# Patient Record
Sex: MALE | URBAN - METROPOLITAN AREA
[De-identification: names, ages, dates, MRNs, and addresses within clinical notes are randomized per-mention and may not be internally consistent; named-entity substitution may affect disease eponyms.]

---

## 2021-06-03 ENCOUNTER — APPOINTMENT (RX ONLY)
Dept: URBAN - METROPOLITAN AREA CLINIC 83 | Facility: CLINIC | Age: 66
Setting detail: DERMATOLOGY
End: 2021-06-03

## 2021-06-03 DIAGNOSIS — L29.89 OTHER PRURITUS: ICD-10-CM

## 2021-06-03 DIAGNOSIS — L29.8 OTHER PRURITUS: ICD-10-CM

## 2021-06-03 DIAGNOSIS — L85.3 XEROSIS CUTIS: ICD-10-CM

## 2021-06-03 DIAGNOSIS — L20.89 OTHER ATOPIC DERMATITIS: ICD-10-CM

## 2021-06-03 PROCEDURE — ? PRESCRIPTION

## 2021-06-03 PROCEDURE — ? TREATMENT REGIMEN

## 2021-06-03 PROCEDURE — ? COUNSELING

## 2021-06-03 PROCEDURE — 99203 OFFICE O/P NEW LOW 30 MIN: CPT

## 2021-06-03 RX ORDER — PREDNISONE 10 MG/1
ONE TABLET ORAL AS DIRECTED
Qty: 30 | Refills: 0 | Status: ERX | COMMUNITY
Start: 2021-06-03

## 2021-06-03 RX ORDER — BETAMETHASONE DIPROPIONATE 0.5 MG/G
SMALL CREAM, AUGMENTED TOPICAL BID
Qty: 50 | Refills: 4 | Status: ERX | COMMUNITY
Start: 2021-06-03

## 2021-06-03 RX ADMIN — BETAMETHASONE DIPROPIONATE SMALL: 0.5 CREAM, AUGMENTED TOPICAL at 00:00

## 2021-06-03 RX ADMIN — PREDNISONE ONE: 10 TABLET ORAL at 00:00

## 2021-06-03 NOTE — HPI: RASH (ECZEMA)
How Severe Is Your Eczema?: moderate
Is This A New Presentation, Or A Follow-Up?: Rash
Additional History: Patient states he is here for a rash on his hands and arms. Patient states the rash is itchy and peels.